# Patient Record
Sex: MALE | Race: WHITE | Employment: OTHER | URBAN - METROPOLITAN AREA
[De-identification: names, ages, dates, MRNs, and addresses within clinical notes are randomized per-mention and may not be internally consistent; named-entity substitution may affect disease eponyms.]

---

## 2019-01-02 ENCOUNTER — THERAPY VISIT (OUTPATIENT)
Dept: PHYSICAL THERAPY | Facility: CLINIC | Age: 64
End: 2019-01-02
Payer: COMMERCIAL

## 2019-01-02 DIAGNOSIS — M75.121 COMPLETE TEAR OF RIGHT ROTATOR CUFF: ICD-10-CM

## 2019-01-02 DIAGNOSIS — M25.511 ACUTE PAIN OF RIGHT SHOULDER: Primary | ICD-10-CM

## 2019-01-02 PROCEDURE — 97110 THERAPEUTIC EXERCISES: CPT | Mod: GP | Performed by: PHYSICAL THERAPIST

## 2019-01-02 PROCEDURE — 97161 PT EVAL LOW COMPLEX 20 MIN: CPT | Mod: GP | Performed by: PHYSICAL THERAPIST

## 2019-01-02 NOTE — PROGRESS NOTES
[facility] Initial Evaluation  Subjective:  HPI                    Objective:  Standing Alignment:    Cervical/Thoracic:  Forward head  Shoulder/UE:  Rounded shoulders and scapular winging R                  Flexibility/Screens:   Negative screens: Cervical           Neurological: He is alert. He has normal reflexes. No cranial nerve deficit or sensory deficit.                      Shoulder Evaluation:  ROM:    PROM:    Flexion:  Left:  170    Right: 160          Internal Rotation:  Left:  80    Right:  80  External Rotation:  Left:  70    Right:  70                    Strength:  not assessed                        Special Tests:      Right shoulder positive for the following special tests:Impingement and Rotator cuff tear                                       General     ROS    Assessment/Plan:    Patient is a 63 year old male with right side shoulder complaints.    Patient has the following significant findings with corresponding treatment plan.                Diagnosis 1:  R shoulder Massive RTC Tear, LHB rupture   Pain -    Decreased ROM/flexibility - manual therapy and therapeutic exercise  Decreased strength - therapeutic exercise and therapeutic activities    Therapy Evaluation Codes:   1) History comprised of:   Personal factors that impact the plan of care:      Time since onset of symptoms and job demands as a self employed  .    Comorbidity factors that impact the plan of care are:      High blood pressure.     Medications impacting care: High blood pressure.  2) Examination of Body Systems comprised of:   Body structures and functions that impact the plan of care:      Shoulder.   Activity limitations that impact the plan of care are:      Bathing, Driving, Dressing, Lifting, Reading/Computer work, Working and Sleeping.  3) Clinical presentation characteristics are:   Stable/Uncomplicated.  4) Decision-Making    Low complexity using standardized patient assessment  instrument and/or measureable assessment of functional outcome.  Cumulative Therapy Evaluation is: Low complexity.    Previous and current functional limitations:  (See Goal Flow Sheet for this information)    Short term and Long term goals: (See Goal Flow Sheet for this information)     Communication ability:  Patient appears to be able to clearly communicate and understand verbal and written communication and follow directions correctly.  Treatment Explanation - The following has been discussed with the patient:   RX ordered/plan of care  Anticipated outcomes  Possible risks and side effects  This patient would benefit from PT intervention to resume normal activities.   Rehab potential is good.    Frequency:  1 X week, once daily  Duration:  for 3 weeks tapering to 1 X a week , every other week over 6 weeks  Discharge Plan:  Achieve all LTG.  Independent in home treatment program.  Reach maximal therapeutic benefit.    Please refer to the daily flowsheet for treatment today, total treatment time and time spent performing 1:1 timed codes.

## 2019-01-02 NOTE — LETTER
[facility]  [address]  [place] [address]  [phone]    2019    Re: Kroy Truong   :   1955  MRN:  [id number]   REFERRING PHYSICIAN:  [facility]    Date of Initial Evaluation:  19  Visits:  Rxs Used: 1  Reason for Referral:     Acute pain of right shoulder  Complete tear of right rotator cuff    [facility] Initial Evaluation  Subjective:  HPI             Objective:  Standing Alignment:    Cervical/Thoracic:  Forward head  Shoulder/UE:  Rounded shoulders and scapular winging R  Flexibility/Screens:   Negative screens: Cervical   Neurological: He is alert. He has normal reflexes. No cranial nerve deficit or sensory deficit.     Shoulder Evaluation:  ROM:    PROM:    Flexion:  Left:  170    Right: 160    Internal Rotation:  Left:  80    Right:  80  External Rotation:  Left:  70    Right:  70    Strength:  not assessed    Special Tests:    Right shoulder positive for the following special tests:Impingement and Rotator cuff tear    Assessment/Plan:    Patient is a 63 year old male with right side shoulder complaints.    Patient has the following significant findings with corresponding treatment plan.                Diagnosis 1:  R shoulder Massive RTC Tear, LHB rupture   Pain -    Decreased ROM/flexibility - manual therapy and therapeutic exercise  Decreased strength - therapeutic exercise and therapeutic activities    Therapy Evaluation Codes:   1) History comprised of:   Personal factors that impact the plan of care:      Time since onset of symptoms and job demands as a self employed  .    Comorbidity factors that impact the plan of care are:      High blood pressure.     Medications impacting care: High blood pressure.  2) Examination of Body Systems comprised of:   Body structures and functions that impact the plan of care:      Shoulder.   Activity limitations that impact the plan of care  are:      Bathing, Driving, Dressing, Lifting, Reading/Computer work, Working and Sleeping.  3) Clinical presentation characteristics are:   Stable/Uncomplicated.  4) Decision-Making    Low complexity using standardized patient assessment instrument and/or measureable assessment of functional outcome.    Cumulative Therapy Evaluation is: Low complexity.  Previous and current functional limitations:  (See Goal Flow Sheet for this information)    Short term and Long term goals: (See Goal Flow Sheet for this information)   Communication ability:  Patient appears to be able to clearly communicate and understand verbal and written communication and follow directions correctly.  Treatment Explanation - The following has been discussed with the patient:   RX ordered/plan of care  Anticipated outcomes  Possible risks and side effects  This patient would benefit from PT intervention to resume normal activities.   Rehab potential is good.    Frequency:  1 X week, once daily  Duration:  for 3 weeks tapering to 1 X a week , every other week over 6 weeks  Discharge Plan:  Achieve all LTG.  Independent in home treatment program.  Reach maximal therapeutic benefit.    Thank you for your referral.    INQUIRIES  Therapist: Yovany Palmer, PT, ATC, Cert. MDT  [facility]  [address] [place] [address]  Phone: [phone]  Fax: [phone]

## 2019-01-09 ENCOUNTER — THERAPY VISIT (OUTPATIENT)
Dept: PHYSICAL THERAPY | Facility: CLINIC | Age: 64
End: 2019-01-09
Payer: COMMERCIAL

## 2019-01-09 DIAGNOSIS — G89.29 CHRONIC PAIN OF BOTH SHOULDERS: Primary | ICD-10-CM

## 2019-01-09 DIAGNOSIS — M25.511 CHRONIC PAIN OF BOTH SHOULDERS: Primary | ICD-10-CM

## 2019-01-09 DIAGNOSIS — M25.512 CHRONIC PAIN OF BOTH SHOULDERS: Primary | ICD-10-CM

## 2019-01-09 PROCEDURE — 97035 APP MDLTY 1+ULTRASOUND EA 15: CPT | Mod: GP | Performed by: PHYSICAL THERAPIST

## 2019-01-09 PROCEDURE — 97112 NEUROMUSCULAR REEDUCATION: CPT | Mod: GP | Performed by: PHYSICAL THERAPIST

## 2019-01-09 PROCEDURE — 97110 THERAPEUTIC EXERCISES: CPT | Mod: GP | Performed by: PHYSICAL THERAPIST

## 2019-01-16 ENCOUNTER — THERAPY VISIT (OUTPATIENT)
Dept: PHYSICAL THERAPY | Facility: CLINIC | Age: 64
End: 2019-01-16
Payer: COMMERCIAL

## 2019-01-16 DIAGNOSIS — M25.511 ACUTE PAIN OF RIGHT SHOULDER: Primary | ICD-10-CM

## 2019-01-16 PROCEDURE — 97110 THERAPEUTIC EXERCISES: CPT | Mod: GP | Performed by: PHYSICAL THERAPIST

## 2019-01-16 PROCEDURE — 97035 APP MDLTY 1+ULTRASOUND EA 15: CPT | Mod: GP | Performed by: PHYSICAL THERAPIST

## 2019-01-16 PROCEDURE — 97112 NEUROMUSCULAR REEDUCATION: CPT | Mod: GP | Performed by: PHYSICAL THERAPIST

## 2019-05-06 ENCOUNTER — THERAPY VISIT (OUTPATIENT)
Dept: PHYSICAL THERAPY | Facility: CLINIC | Age: 64
End: 2019-05-06
Payer: COMMERCIAL

## 2019-05-06 PROCEDURE — 97112 NEUROMUSCULAR REEDUCATION: CPT | Mod: GP | Performed by: PHYSICAL THERAPIST

## 2019-05-06 PROCEDURE — 97110 THERAPEUTIC EXERCISES: CPT | Mod: GP | Performed by: PHYSICAL THERAPIST

## 2019-07-19 NOTE — PROGRESS NOTES
Subjective:  7/191/9 Addendum: Patient had been seen in PT for 4 visits 1/2/19-5/6/19. He has been self managing since. No future PT appointments                           Objective:  System    Physical Exam    General     ROS    Assessment/Plan:    DISCHARGE REPORT    Progress reporting period is from 1/2/19 to 5/6/19.     SUBJECTIVE  : Kory has not been seen in PT or by his MD for the past 4 months. He arrives today with ongoing complaints of R shoulder pain. He is overall improved with self cares and day to day activity but lacks the improvement with reaching laterally, overead or heavy lifting.    Current Pain level: 2/10   Initial Pain level: 8/10   Changes in function: Yes, see goal flow sheet for change in function   Adverse reactions: None;   ,         OBJECTIVE  : Deyvi has restored most ROM with assist, he lacks posterior reach mobility. Weak and painful ER/ABd/and IR. POsterior reachto S2. Cross body reach is also painful posterior shoulder. Deyvi is recommended to follow up with his MD for cosult, continue supine RTC program , gentle posterior capsule stretch.       ASSESSMENT/PLAN  Updated problem list and treatment plan: Diagnosis 1:  Bilateral shoulder pain, RC Arthropathy       Assessment of Progress: The patient's condition is improving.      Recommendations:  This patient is ready to be discharged from therapy and continue their home treatment program.  This patient would benefit from further evaluation.    Please refer to the daily flowsheet for treatment today, total treatment time and time spent performing 1:1 timed codes.

## 2019-07-29 ENCOUNTER — THERAPY VISIT (OUTPATIENT)
Dept: PHYSICAL THERAPY | Facility: CLINIC | Age: 64
End: 2019-07-29
Payer: COMMERCIAL

## 2019-07-29 PROCEDURE — 97110 THERAPEUTIC EXERCISES: CPT | Mod: GP | Performed by: PHYSICAL THERAPIST

## 2019-07-29 PROCEDURE — 97112 NEUROMUSCULAR REEDUCATION: CPT | Mod: GP | Performed by: PHYSICAL THERAPIST

## 2019-07-29 NOTE — PROGRESS NOTES
Subjective:  HPI                    Objective:  System    Physical Exam    General     ROS    Assessment/Plan:    DISCHARGE REPORT    Progress reporting period is from 5/6/19 to 7/29/19. 2 visits      SUBJECTIVE  Kory shows to physical therapy clinic after being self managing for the past 10 or 11 weeks in a supine rotator cuff program along with gentle posterior capsule range of motion and scapular strengthening.    He continues to have chief complaint of bilateral shoulder pain but is far less than he initiated physical therapy with and really significant less than the beginning of the year when he first seen his physician.  Deyvi is fully able to self manage his day-to-day function with little or no pain now.  Is also able to sleep through the night however avoids lying on his sides.  Contribution to his pain reduction is twofold, one perhaps physical therapy intervention in a supine program, but also that the patient has significantly reduced his workload, avoids heavy lifting and repetition exercise that he had been doing prior to therapy.    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    SPADI  6/100       Initial Pain level: 8/10   Current Pail Level : 2/10    Changes in function: Yes, see goal flow sheet for change in function   Adverse reactions: None;   ,         OBJECTIVE    ROM      R     L               Flexion   160    160  pER    80    80  pIR    90    90  Post reach   L5    T8    Weak and painless ER, ABduction and IR R side.     Scap control is decent  actively elevating his arms when elevating his arms to 90 degrees.    No painful arc noted         ASSESSMENT/PLAN  Updated problem list and treatment plan: Diagnosis 1: Right rotator cuff deficient   Diagnosis 2: Left rotator cuff tear       Assessment of Progress: The patient's condition is improving.    Recommendations:  This patient is ready to be discharged from therapy and continue their home treatment  program.  This patient would benefit from further evaluation.  Follow-up with Dr. Luna in the fall 2019, until then patient will be self managing supine rotator cuff and light scapular exercise.    Please refer to the daily flowsheet for treatment today, total treatment time and time spent performing 1:1 timed codes.

## 2019-07-29 NOTE — LETTER
[facility]  [address]  [place] [address]  [phone]    2019    Re: Kory Truong   :   1955  MRN:  [id number]   REFERRING PHYSICIAN:   [facility]    Date of Initial Evaluation:  19  Visits:  Rxs Used: 5  Reason for Referral:  Chronic pain of both shoulders    DISCHARGE REPORT  Progress reporting period is from 19 to 19. 2 visits      SUBJECTIVE  Kory shows to physical therapy clinic after being self managing for the past 10 or 11 weeks in a supine rotator cuff program along with gentle posterior capsule range of motion and scapular strengthening.    He continues to have chief complaint of bilateral shoulder pain but is far less than he initiated physical therapy with and really significant less than the beginning of the year when he first seen his physician.  Deyvi is fully able to self manage his day-to-day function with little or no pain now.  Is also able to sleep through the night however avoids lying on his sides.  Contribution to his pain reduction is twofold, one perhaps physical therapy intervention in a supine program, but also that the patient has significantly reduced his workload, avoids heavy lifting and repetition exercise that he had been doing prior to therapy.  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    SPADI  6/100   Initial Pain level: 8/10   Current Pail Level : 2/10  Changes in function: Yes, see goal flow sheet for change in function   Adverse reactions: None      OBJECTIVE    ROM      R     L             Flexion   160    160  pER    80    80  pIR    90    90  Post reach   L5    T8  Weak and painless ER, ABduction and IR R side.   Scap control is decent  actively elevating his arms when elevating his arms to 90 degrees.  No painful arc noted       ASSESSMENT/PLAN  Updated problem list and treatment plan: Diagnosis 1: Right rotator cuff deficient   Diagnosis 2:  Left rotator cuff tear     Assessment of Progress: The patient's condition is improving.    Recommendations:  This patient is ready to be discharged from therapy and continue their home treatment program.  This patient would benefit from further evaluation.  Follow-up with Dr. Luna in the fall 2019, until then patient will be self managing supine rotator cuff and light scapular exercise.    Thank you for your referral.    INQUIRIES  Therapist: Yovany Palmer PT, ATC, Cert. [facility]  [address] [place] [address]  Phone: [phone]  Fax: [phone]